# Patient Record
Sex: FEMALE | Race: WHITE
[De-identification: names, ages, dates, MRNs, and addresses within clinical notes are randomized per-mention and may not be internally consistent; named-entity substitution may affect disease eponyms.]

---

## 2023-10-11 ENCOUNTER — HOSPITAL ENCOUNTER (OUTPATIENT)
Dept: HOSPITAL 76 - DI | Age: 63
Discharge: HOME | End: 2023-10-11
Attending: PODIATRIST
Payer: MEDICARE

## 2023-10-11 DIAGNOSIS — M79.671: Primary | ICD-10-CM

## 2023-10-11 DIAGNOSIS — M20.11: ICD-10-CM

## 2023-10-11 DIAGNOSIS — M21.41: ICD-10-CM

## 2023-10-11 NOTE — XRAY REPORT
PROCEDURE:  Foot 3 View RT

 

INDICATIONS:  RT FOOT PAIN

 

TECHNIQUE:  3 views of the foot were obtained.  

 

COMPARISON:  None

 

FINDINGS:  

 

Bones:  No fractures or dislocations.  No suspicious bony lesions. There is loss of the longitudinal 
arch. Mild medial angulation of the first metatarsal noted.

 

Soft tissues: Unremarkable. No radiopaque foreign body.

 

IMPRESSION:  

 

Mild hallux valgus and pes planus

 

 

Reviewed by: Shiva Kay MD on 10/11/2023 6:25 PM AKDT

Approved by: Shiva Kay MD on 10/11/2023 6:25 PM AKDT

 

 

Station ID:  [id number]